# Patient Record
Sex: MALE | Race: WHITE | NOT HISPANIC OR LATINO | URBAN - METROPOLITAN AREA
[De-identification: names, ages, dates, MRNs, and addresses within clinical notes are randomized per-mention and may not be internally consistent; named-entity substitution may affect disease eponyms.]

---

## 2022-08-08 ENCOUNTER — WEB ENCOUNTER (OUTPATIENT)
Dept: URBAN - METROPOLITAN AREA CLINIC 74 | Facility: CLINIC | Age: 62
End: 2022-08-08

## 2022-08-08 ENCOUNTER — OFFICE VISIT (OUTPATIENT)
Dept: URBAN - METROPOLITAN AREA CLINIC 74 | Facility: CLINIC | Age: 62
End: 2022-08-08
Payer: COMMERCIAL

## 2022-08-08 ENCOUNTER — LAB OUTSIDE AN ENCOUNTER (OUTPATIENT)
Dept: URBAN - METROPOLITAN AREA CLINIC 74 | Facility: CLINIC | Age: 62
End: 2022-08-08

## 2022-08-08 ENCOUNTER — DASHBOARD ENCOUNTERS (OUTPATIENT)
Age: 62
End: 2022-08-08

## 2022-08-08 VITALS
BODY MASS INDEX: 27.8 KG/M2 | DIASTOLIC BLOOD PRESSURE: 88 MMHG | HEIGHT: 73 IN | WEIGHT: 209.8 LBS | SYSTOLIC BLOOD PRESSURE: 140 MMHG | HEART RATE: 87 BPM | TEMPERATURE: 97.6 F

## 2022-08-08 DIAGNOSIS — K21.9 GERD WITHOUT ESOPHAGITIS: ICD-10-CM

## 2022-08-08 DIAGNOSIS — K44.9 HIATAL HERNIA: ICD-10-CM

## 2022-08-08 DIAGNOSIS — Z12.11 COLON CANCER SCREENING: ICD-10-CM

## 2022-08-08 PROBLEM — 266435005: Status: ACTIVE | Noted: 2022-08-08

## 2022-08-08 PROBLEM — 428283002 HISTORY OF POLYP OF COLON: Status: ACTIVE | Noted: 2022-08-08

## 2022-08-08 PROCEDURE — 99243 OFF/OP CNSLTJ NEW/EST LOW 30: CPT | Performed by: INTERNAL MEDICINE

## 2022-08-08 PROCEDURE — 99203 OFFICE O/P NEW LOW 30 MIN: CPT | Performed by: INTERNAL MEDICINE

## 2022-08-08 RX ORDER — ASPIRIN 81 MG/1
1 TABLET TABLET, COATED ORAL ONCE A DAY
Status: ACTIVE | COMMUNITY

## 2022-08-08 RX ORDER — ESOMEPRAZOLE MAGNESIUM 40 MG/1
1 CAPSULE CAPSULE, DELAYED RELEASE ORAL ONCE A DAY
Status: ACTIVE | COMMUNITY

## 2022-08-08 RX ORDER — AMLODIPINE BESYLATE 5 MG/1
1 TABLET TABLET ORAL ONCE A DAY
Status: ACTIVE | COMMUNITY

## 2022-08-08 RX ORDER — BENAZEPRIL HYDROCHLORIDE 20 MG/1
1 TABLET TABLET, COATED ORAL ONCE A DAY
Status: ACTIVE | COMMUNITY

## 2022-08-08 NOTE — HPI-TODAY'S VISIT:
--The patient presents on referral for colonoscopy and GERD by Dr. Gerardo Santiago.    --A copy of this document will be sent to the referring provider.    --The patient has had colonoscopy before.  Colonoscopy 2011 negative for polyps. --Hx diverticulosis. --Hx of GERD and Hx of severe esophagitis, HH with S ring, prior dilation remotely. Ongoing GERD with typical foods. No SFD. Bx negative for BE/IM in the past.  --The patient does not have any risk factors for colon cancer, but is over the recommended age for screening.  There is no recent history of rectal bleeding.  The patient has no pertinent additional complaints of abdominal pain, constipation, diarrhea, or weight loss.

## 2022-09-13 ENCOUNTER — OFFICE VISIT (OUTPATIENT)
Dept: URBAN - METROPOLITAN AREA SURGERY CENTER 30 | Facility: SURGERY CENTER | Age: 62
End: 2022-09-13
Payer: COMMERCIAL

## 2022-09-13 DIAGNOSIS — Z12.11 COLON CANCER SCREENING: ICD-10-CM

## 2022-09-13 PROCEDURE — 45378 DIAGNOSTIC COLONOSCOPY: CPT | Performed by: INTERNAL MEDICINE

## 2022-09-13 PROCEDURE — G8907 PT DOC NO EVENTS ON DISCHARG: HCPCS | Performed by: INTERNAL MEDICINE

## 2022-09-28 ENCOUNTER — OFFICE VISIT (OUTPATIENT)
Dept: URBAN - METROPOLITAN AREA CLINIC 74 | Facility: CLINIC | Age: 62
End: 2022-09-28

## 2022-10-03 ENCOUNTER — OFFICE VISIT (OUTPATIENT)
Dept: URBAN - METROPOLITAN AREA CLINIC 74 | Facility: CLINIC | Age: 62
End: 2022-10-03

## 2025-04-15 PROBLEM — 399020009: Status: ACTIVE | Noted: 2025-04-15

## 2025-04-16 ENCOUNTER — OFFICE VISIT (OUTPATIENT)
Dept: URBAN - METROPOLITAN AREA CLINIC 74 | Facility: CLINIC | Age: 65
End: 2025-04-16
Payer: COMMERCIAL

## 2025-04-16 ENCOUNTER — OFFICE VISIT (OUTPATIENT)
Dept: URBAN - METROPOLITAN AREA CLINIC 74 | Facility: CLINIC | Age: 65
End: 2025-04-16

## 2025-04-16 DIAGNOSIS — I48.0 PAROXYSMAL ATRIAL FIBRILLATION: ICD-10-CM

## 2025-04-16 DIAGNOSIS — K44.9 HIATAL HERNIA: ICD-10-CM

## 2025-04-16 DIAGNOSIS — K21.9 GERD WITHOUT ESOPHAGITIS: ICD-10-CM

## 2025-04-16 DIAGNOSIS — Z79.01 BLOOD THINNED DUE TO LONG-TERM ANTICOAGULANT USE: ICD-10-CM

## 2025-04-16 DIAGNOSIS — I42.0 DILATED CARDIOMYOPATHY: ICD-10-CM

## 2025-04-16 PROCEDURE — 99204 OFFICE O/P NEW MOD 45 MIN: CPT | Performed by: INTERNAL MEDICINE

## 2025-04-16 PROCEDURE — 99214 OFFICE O/P EST MOD 30 MIN: CPT | Performed by: INTERNAL MEDICINE

## 2025-04-16 RX ORDER — AMLODIPINE BESYLATE 5 MG/1
1 TABLET TABLET ORAL ONCE A DAY
Status: ACTIVE | COMMUNITY

## 2025-04-16 RX ORDER — ESOMEPRAZOLE MAGNESIUM 40 MG/1
1 CAPSULE CAPSULE, DELAYED RELEASE ORAL ONCE A DAY
Status: ACTIVE | COMMUNITY

## 2025-04-16 RX ORDER — ASPIRIN 81 MG/1
1 TABLET TABLET, COATED ORAL ONCE A DAY
Status: ACTIVE | COMMUNITY

## 2025-04-16 RX ORDER — BENAZEPRIL HYDROCHLORIDE 20 MG/1
1 TABLET TABLET, COATED ORAL ONCE A DAY
Status: ACTIVE | COMMUNITY

## 2025-04-16 NOTE — HPI-TODAY'S VISIT:
--The patient presents on referral. Last seen 2022. --He is here today because his new primary physician had recently checked labs and noticed some abnormalities and then ordered a CT scan with some abnormalities.  The patient is completely asymptomatic from a GI standpoint.  He has regular bowel movements.  His acid reflux is well-controlled on Nexium daily, no trouble swallowing since our last intervention here with endoscopy.  Colonoscopy 2022 was normal other than diverticulosis.  Prior colonoscopies have also been negative.  In summary he had a mildly elevated alkaline phosphatase and for other reasons she was concerned of lung cancer.  During CT imaging the findings were noted as below.  She recommended a GI consult, he was referred to [facility] but then made an appointment here since he is established.  He saw his hematologist the other day and was told there was nothing wrong.  He had labs this morning and CBC and CMP are normal, alk phos is now 83 today. --CT 3/2025: Large amount of stool anterior to the liver and fluid distention of small bowel, ? enteritis. DDx of the stool changes near the liver included congenital vs prior surgery. Otherwise no acute changes, no overt IBD, the liver and GB and pancreas normal. --Labs 3/2025: cbc/cmp normal. TSH normal. Alk Phos 130 (pt worried about that). --A copy of this document will be sent to the referring provider.   --No recent imaging or labs to review. --The patient has had colonoscopy before.  Colonoscopy 2011 and 2022 negative for polyps. Next due 2032. --Hx diverticulosis. --Hx of GERD and Hx of severe esophagitis, HH with S ring, prior dilation remotely. Ongoing GERD with typical foods. No SFD. Bx negative for BE/IM in the past. --Afib, CHF, Cardiomyopathy, HTN. ON ELIQUIS.  ========== Recent cardiology note 1/2025: Problem List:  1. Persistent atrial fibrillation  ... a. Initially identified prior to colonoscopy, incidentally on EKG, 09/14/22.  ... b. Recurrent AF identified on EKG, 10/03/22.  ... c. S/P CV of AF 10/19/22.  ... d. CHADsVASc = 2...(CHF and HTN), indicating intermediate risk of cardioembolic CVA.  ... e. Anticoagulation therapy: Eliquis.  ... f. Rate control strategy: carvedilol.  ... g. s/p AF cryo ablation 11/28/2022. AF at baseline. Additional lesions to ALVA ridge. CV was performed to restore SR.  2. Dilated nonischemic cardiomyopathy with chronic combined systolic & diastolic heart failure  ... a. TTE 10/03/22 showed EF 35-40%, KENNEDY, mild MR, TR & AI.  ... b. LHC 10/2022 showed normal coronaries.  ... c. EF 35-40% on SERGIO 11/28/2022  ... D. Echo 09/19/2023 - EF 40-45%, mild LV DD, enlarged LA 3. Hypertensive disorder, well controlled.  4. Gastroesophageal reflux disease w/o esophagitis, currently on PPI therapy.   Impression/Plan: 1. Persistent atrial fibrillation, s/p AF cryo ablation 11/28/2022. Patient was in Afib at baseline. He had additional lesions to the ALVA ridge. CV was performed to restore NSR. He had a Convergent AF ablation and ALVA clip 03/21/2023. He denies any palpitations or recurrent Afib. EKG 10/11/2024 revealed SB. HR is 65 bpm today in office. Continue carvedilol and aspirin. There is no indication for anticoagulation given prior ALVA clipping. Avoid caffeine, stimulants and alcohol. F/U appt in 1 year.   2. Hypertension, well controlled. BP was 118/76 at his last office visit. Continue carvedilol, amlodipine and benazepril. Advised low sodium diet. Monitor BP   3. Dilated nonischemic cardiomyopathy with chronic systolic and diastolic heart failure. Echo 10/03/2022 showed an EF 35-40%. Cath showed normal coronaries. Repeat echo 09/19/2023 showed an EF of 40-45%. He denies any SOB/BASURTO, orthopnea or lower extremity edema. Continue carvedilol and benazepril. F/U with Dr. Marrero as scheduled.